# Patient Record
Sex: MALE | Race: WHITE | HISPANIC OR LATINO | URBAN - METROPOLITAN AREA
[De-identification: names, ages, dates, MRNs, and addresses within clinical notes are randomized per-mention and may not be internally consistent; named-entity substitution may affect disease eponyms.]

---

## 2018-12-04 ENCOUNTER — EMERGENCY (EMERGENCY)
Facility: HOSPITAL | Age: 28
LOS: 1 days | Discharge: HOME | End: 2018-12-04
Attending: EMERGENCY MEDICINE | Admitting: EMERGENCY MEDICINE
Payer: MEDICAID

## 2018-12-04 VITALS
RESPIRATION RATE: 16 BRPM | TEMPERATURE: 98 F | OXYGEN SATURATION: 100 % | SYSTOLIC BLOOD PRESSURE: 152 MMHG | DIASTOLIC BLOOD PRESSURE: 118 MMHG | HEART RATE: 89 BPM

## 2018-12-04 VITALS
HEART RATE: 100 BPM | RESPIRATION RATE: 16 BRPM | OXYGEN SATURATION: 100 % | DIASTOLIC BLOOD PRESSURE: 133 MMHG | SYSTOLIC BLOOD PRESSURE: 184 MMHG | TEMPERATURE: 98 F

## 2018-12-04 PROCEDURE — 99283 EMERGENCY DEPT VISIT LOW MDM: CPT

## 2018-12-04 PROCEDURE — 73564 X-RAY EXAM KNEE 4 OR MORE: CPT | Mod: 26,RT

## 2018-12-04 RX ORDER — IBUPROFEN 200 MG
600 TABLET ORAL ONCE
Qty: 0 | Refills: 0 | Status: COMPLETED | OUTPATIENT
Start: 2018-12-04 | End: 2018-12-04

## 2018-12-04 RX ADMIN — Medication 600 MILLIGRAM(S): at 12:00

## 2018-12-04 NOTE — ED PROVIDER NOTE - MEDICAL DECISION MAKING DETAILS
29yo M no pmhx p/w cc of R knee pain. R knee pain will get xrays/pain control, high blood pressure readings likely 2/2 pain, asx at this time, will monitor.

## 2018-12-04 NOTE — ED PROVIDER NOTE - PROGRESS NOTE DETAILS
omar pgy1: pt ambulating without issue, comfortable w/ following up w/ orthopedic doctor, xrays negative, will d/c

## 2018-12-04 NOTE — ED PROVIDER NOTE - ATTENDING CONTRIBUTION TO CARE
28M no pmh presents s/p accidental trip and fall on sidewalk. Landed on right knee with localized pain. Able to ambulate after fall with slight limp 2/2 pain. Patient denies headache, vision changes, focal weakness/numbness, neck pain, fever, cough, chest pain, shortness of breath, back pain, abd pain, nausea, vomiting, diarrhea, constipation, blood in stool, dysuria, rash, trauma, falls. Patient is well appearing, conversant, cooperative, smiling, head atraumatic, neck supple with full range of motion, oropharynx clear, lungs clear, no crackles or rales, speaking full sentences, heart clear, no murmurs, distal pulses equal in all 4 extremities, abdomen soft nontender nondistended with no masses, no leg edema, no calf tenderness, nonfocal neurologic exam. Right knee with slight anterior tenderness. Knee without abrasions, lacerations, effusions. Both legs are distally neurovascularly intact.    XR to r/o fracture. Patient stable and ambulatory. Pain control. Ace wrap. Outpatient ortho followup and primary care followup for elevated blood pressure.    Patient asking to go home. Family at bedside.

## 2018-12-04 NOTE — ED ADULT NURSE NOTE - OBJECTIVE STATEMENT
Pt A&Ox3 ambulatory to room 12. Presented to the ED s/p trip and fall. Pt c/o right knee pain. Pt noted to be hypertensive, MD Carlos aware, no orders to be given at this time. No redness, bruising, or swelling noted to right knee. Pt denies chest pain, N/V, blurry vision, SOB at this time. Sitting comfortably, family at bedside, pending x ray. will reassess

## 2018-12-04 NOTE — ED PROVIDER NOTE - NSFOLLOWUPINSTRUCTIONS_ED_ALL_ED_FT
(1) Follow up with an Orthopedic Doctor as discussed  (2) Immediately seek care at your nearest emergency room if your worsen, persist, or do not resolve

## 2018-12-04 NOTE — ED PROVIDER NOTE - OBJECTIVE STATEMENT
27yo M no pmhx p/w cc of R knee pain    Patient tripped and fell due to deformity in sidewalk and hit R knee (no trauma to head/anywhere else in body), ambulatory but limping. Was told he had high blood pressure in past and saw physician, however was not diagnosed with HTN. No vision changes, no pain anywhere else. Denies cocaine use/any other substance use, has not had any alcohol recently, no SOB/CP. No N/V/D. No mother medical problems.

## 2018-12-04 NOTE — ED ADULT TRIAGE NOTE - CHIEF COMPLAINT QUOTE
Pt tripped on broken sidewalk and fell onto his right.  No head trauma or syncope.  C/O right knee pain.  Ambulatory.  No PMH HTN

## 2018-12-04 NOTE — ED PROVIDER NOTE - PHYSICAL EXAMINATION
PHYSICAL EXAM:  GENERAL: NAD, speaks in full sentences, no signs of respiratory distress  HEAD:  Atraumatic, Normocephalic  EYES: EOMI, PERRLA, conjunctiva and sclera clear  CHEST/LUNG: Clear to auscultation bilaterally; No wheeze; No crackles; No accessory muscles used  HEART: Regular rate and rhythm; No murmurs;   ABDOMEN: Soft, Nontender, Nondistended; Bowel sounds present; No guarding  EXTREMITIES:  R knee point tenderness over patella, full active ROM, no erythema/swelling, pulses intact over R extremity, no foot tenderness/deformity   PSYCH: AAOx3  NEUROLOGY: non-focal  SKIN: No rashes or lesions

## 2018-12-04 NOTE — ED ADULT NURSE NOTE - NSIMPLEMENTINTERV_GEN_ALL_ED
Implemented All Fall Risk Interventions:  [place] to call system. Call bell, personal items and telephone within reach. Instruct patient to call for assistance. Room bathroom lighting operational. Non-slip footwear when patient is off stretcher. Physically safe environment: no spills, clutter or unnecessary equipment. Stretcher in lowest position, wheels locked, appropriate side rails in place. Provide visual cue, wrist band, yellow gown, etc. Monitor gait and stability. Monitor for mental status changes and reorient to person, place, and time. Review medications for side effects contributing to fall risk. Reinforce activity limits and safety measures with patient and family.